# Patient Record
Sex: FEMALE
[De-identification: names, ages, dates, MRNs, and addresses within clinical notes are randomized per-mention and may not be internally consistent; named-entity substitution may affect disease eponyms.]

---

## 2022-06-07 PROBLEM — Z00.00 ENCOUNTER FOR PREVENTIVE HEALTH EXAMINATION: Status: ACTIVE | Noted: 2022-06-07

## 2022-06-09 ENCOUNTER — APPOINTMENT (OUTPATIENT)
Dept: GASTROENTEROLOGY | Facility: CLINIC | Age: 62
End: 2022-06-09
Payer: COMMERCIAL

## 2022-06-09 VITALS
HEIGHT: 65.5 IN | BODY MASS INDEX: 27 KG/M2 | OXYGEN SATURATION: 97 % | DIASTOLIC BLOOD PRESSURE: 67 MMHG | TEMPERATURE: 98.1 F | HEART RATE: 107 BPM | RESPIRATION RATE: 16 BRPM | WEIGHT: 164 LBS | SYSTOLIC BLOOD PRESSURE: 112 MMHG

## 2022-06-09 DIAGNOSIS — Z78.9 OTHER SPECIFIED HEALTH STATUS: ICD-10-CM

## 2022-06-09 DIAGNOSIS — K52.832 LYMPHOCYTIC COLITIS: ICD-10-CM

## 2022-06-09 DIAGNOSIS — K52.9 NONINFECTIVE GASTROENTERITIS AND COLITIS, UNSPECIFIED: ICD-10-CM

## 2022-06-09 DIAGNOSIS — Z87.19 PERSONAL HISTORY OF OTHER DISEASES OF THE DIGESTIVE SYSTEM: ICD-10-CM

## 2022-06-09 PROCEDURE — 99204 OFFICE O/P NEW MOD 45 MIN: CPT

## 2022-06-09 RX ORDER — BUDESONIDE 3 MG/1
3 CAPSULE, COATED PELLETS ORAL
Qty: 90 | Refills: 3 | Status: ACTIVE | COMMUNITY
Start: 2022-06-09 | End: 1900-01-01

## 2022-06-09 NOTE — ASSESSMENT
[FreeTextEntry1] : 61F with lymphocytic coliits\par - will trial low fodamp diet\par - stool, lab tests\par - budesonide if no improvement\par - f/u after above

## 2022-06-09 NOTE — HISTORY OF PRESENT ILLNESS
[de-identified] : 61F presents for microscopic colitis eval\par hx of gerd and excessive PPI use\par then that resolved but subsequently after developed diarrhea which intermittently flared recently flare is not remitting\par cscope oct 2021 shows lymphocytic colitis\par pt looking for integrative approach, dietary guidance\par no signfiicant PMHx\par no Rxs\par No toxic habits\par

## 2022-06-09 NOTE — PHYSICAL EXAM
[General Appearance - Alert] : alert [Sclera] : the sclera and conjunctiva were normal [Outer Ear] : the ears and nose were normal in appearance [Neck Appearance] : the appearance of the neck was normal [Heart Rate And Rhythm] : heart rate was normal and rhythm regular [Edema] : there was no peripheral edema [Bowel Sounds] : normal bowel sounds [Abdomen Soft] : soft [Abdomen Tenderness] : non-tender [Abdomen Mass (___ Cm)] : no abdominal mass palpated [Abnormal Walk] : normal gait [] : no rash [No Focal Deficits] : no focal deficits [Affect] : the affect was normal

## 2022-06-20 ENCOUNTER — APPOINTMENT (OUTPATIENT)
Dept: GASTROENTEROLOGY | Facility: CLINIC | Age: 62
End: 2022-06-20

## 2022-06-20 DIAGNOSIS — E55.9 VITAMIN D DEFICIENCY, UNSPECIFIED: ICD-10-CM

## 2022-06-20 LAB
25(OH)D3 SERPL-MCNC: 19.8 NG/ML
ALBUMIN SERPL ELPH-MCNC: 4.5 G/DL
ALP BLD-CCNC: 90 U/L
ALT SERPL-CCNC: 49 U/L
ANA PAT FLD IF-IMP: ABNORMAL
ANA SER IF-ACNC: ABNORMAL
ANION GAP SERPL CALC-SCNC: 14 MMOL/L
AST SERPL-CCNC: 39 U/L
BASOPHILS # BLD AUTO: 0.08 K/UL
BASOPHILS NFR BLD AUTO: 1 %
BILIRUB SERPL-MCNC: 0.7 MG/DL
BUN SERPL-MCNC: 17 MG/DL
CALCIUM SERPL-MCNC: 9.6 MG/DL
CHLORIDE SERPL-SCNC: 102 MMOL/L
CO2 SERPL-SCNC: 25 MMOL/L
CREAT SERPL-MCNC: 0.78 MG/DL
CRP SERPL-MCNC: 4 MG/L
EGFR: 86 ML/MIN/1.73M2
EOSINOPHIL # BLD AUTO: 0.26 K/UL
EOSINOPHIL NFR BLD AUTO: 3.3 %
FERRITIN SERPL-MCNC: 337 NG/ML
FOLATE SERPL-MCNC: 10.1 NG/ML
GLIADIN IGA SER QL: 5.1 UNITS
GLIADIN IGG SER QL: <5 UNITS
GLIADIN PEPTIDE IGA SER-ACNC: NEGATIVE
GLIADIN PEPTIDE IGG SER-ACNC: NEGATIVE
GLUCOSE SERPL-MCNC: 150 MG/DL
HCT VFR BLD CALC: 44.7 %
HGB BLD-MCNC: 15.4 G/DL
IGA SER QL IEP: 347 MG/DL
IMM GRANULOCYTES NFR BLD AUTO: 0.4 %
IRON SATN MFR SERPL: 33 %
IRON SERPL-MCNC: 104 UG/DL
LYMPHOCYTES # BLD AUTO: 2.4 K/UL
LYMPHOCYTES NFR BLD AUTO: 30.3 %
MAN DIFF?: NORMAL
MCHC RBC-ENTMCNC: 31.6 PG
MCHC RBC-ENTMCNC: 34.5 GM/DL
MCV RBC AUTO: 91.8 FL
MONOCYTES # BLD AUTO: 0.66 K/UL
MONOCYTES NFR BLD AUTO: 8.3 %
NEUTROPHILS # BLD AUTO: 4.49 K/UL
NEUTROPHILS NFR BLD AUTO: 56.7 %
PLATELET # BLD AUTO: 255 K/UL
POTASSIUM SERPL-SCNC: 4.1 MMOL/L
PROT SERPL-MCNC: 7.8 G/DL
RBC # BLD: 4.87 M/UL
RBC # FLD: 12.6 %
SODIUM SERPL-SCNC: 141 MMOL/L
TIBC SERPL-MCNC: 320 UG/DL
TSH SERPL-ACNC: 2.47 UIU/ML
TTG IGA SER IA-ACNC: 1.2 U/ML
TTG IGA SER-ACNC: NEGATIVE
TTG IGG SER IA-ACNC: 4 U/ML
TTG IGG SER IA-ACNC: NEGATIVE
UIBC SERPL-MCNC: 216 UG/DL
VIT B12 SERPL-MCNC: 784 PG/ML
WBC # FLD AUTO: 7.92 K/UL

## 2022-06-20 PROCEDURE — 99442: CPT

## 2022-06-20 RX ORDER — ERGOCALCIFEROL 1.25 MG/1
1.25 MG CAPSULE, LIQUID FILLED ORAL
Qty: 8 | Refills: 5 | Status: ACTIVE | COMMUNITY
Start: 2022-06-20 | End: 1900-01-01

## 2023-10-05 ENCOUNTER — TRANSCRIPTION ENCOUNTER (OUTPATIENT)
Age: 63
End: 2023-10-05

## 2023-10-06 ENCOUNTER — EMERGENCY (EMERGENCY)
Facility: HOSPITAL | Age: 63
LOS: 1 days | Discharge: HOME | End: 2023-10-06
Payer: COMMERCIAL

## 2023-10-06 VITALS
DIASTOLIC BLOOD PRESSURE: 78 MMHG | HEIGHT: 65 IN | SYSTOLIC BLOOD PRESSURE: 137 MMHG | WEIGHT: 154.98 LBS | HEART RATE: 74 BPM | OXYGEN SATURATION: 99 % | RESPIRATION RATE: 16 BRPM

## 2023-10-06 PROCEDURE — 99284 EMERGENCY DEPT VISIT MOD MDM: CPT

## 2023-10-06 RX ORDER — ACETAMINOPHEN 500 MG
650 TABLET ORAL ONCE
Refills: 0 | Status: COMPLETED | OUTPATIENT
Start: 2023-10-06 | End: 2023-10-06

## 2023-10-06 RX ORDER — TETANUS TOXOID, REDUCED DIPHTHERIA TOXOID AND ACELLULAR PERTUSSIS VACCINE, ADSORBED 5; 2.5; 8; 8; 2.5 [IU]/.5ML; [IU]/.5ML; UG/.5ML; UG/.5ML; UG/.5ML
0.5 SUSPENSION INTRAMUSCULAR ONCE
Refills: 0 | Status: COMPLETED | OUTPATIENT
Start: 2023-10-06 | End: 2023-10-06

## 2023-10-06 RX ADMIN — Medication 650 MILLIGRAM(S): at 23:42

## 2023-10-06 RX ADMIN — TETANUS TOXOID, REDUCED DIPHTHERIA TOXOID AND ACELLULAR PERTUSSIS VACCINE, ADSORBED 0.5 MILLILITER(S): 5; 2.5; 8; 8; 2.5 SUSPENSION INTRAMUSCULAR at 23:43

## 2023-10-06 NOTE — ED PROVIDER NOTE - SECONDARY DIAGNOSIS.
Facial contusion, initial encounter Fall as cause of accidental injury in home as place of occurrence

## 2023-10-06 NOTE — ED PROVIDER NOTE - CLINICAL SUMMARY MEDICAL DECISION MAKING FREE TEXT BOX
Medical decision making:  Although patient is under the age of 63, she had an LOC associated with the fall, and the location of the laceration with associated tenderness to palpation warrants imaging of the orbit.      As such we will admit CT head and max face.  No midline neck tenderness that would necessitate CT C-spine.  Patient's Tdap is not up-to-date and we will administer tonight.    Given that she has a large facial laceration, patient is requesting plastic surgery, which is reasonable at this time.

## 2023-10-06 NOTE — ED PROVIDER NOTE - NSFOLLOWUPINSTRUCTIONS_ED_ALL_ED_FT
Laceration    A laceration is a cut that goes through all of the layers of the skin and into the tissue that is right under the skin. Some lacerations heal on their own. Others need to be closed with skin adhesive strips, skin glue, stitches (sutures), or staples. Proper laceration care minimizes the risk of infection and helps the laceration to heal better.  If non-absorbable stitches or staples have been placed, they must be taken out within the time frame instructed by your healthcare provider.    SEEK IMMEDIATE MEDICAL CARE IF YOU HAVE ANY OF THE FOLLOWING SYMPTOMS: swelling around the wound, worsening pain, drainage from the wound, red streaking going away from your wound, inability to move finger or toe near the laceration, or discoloration of skin near the laceration.    Follow-up next week with plastic surgery, call to schedule an appointment:    Carlos Manuel Emerson  Plastic Surgery  [address] [place] [address]  Phone: [phone]  Fax: [phone]  Follow Up Time: 4-6 Days Laceration    A laceration is a cut that goes through all of the layers of the skin and into the tissue that is right under the skin. Some lacerations heal on their own. Others need to be closed with skin adhesive strips, skin glue, stitches (sutures), or staples. Proper laceration care minimizes the risk of infection and helps the laceration to heal better.  If non-absorbable stitches or staples have been placed, they must be taken out within the time frame instructed by your healthcare provider.    SEEK IMMEDIATE MEDICAL CARE IF YOU HAVE ANY OF THE FOLLOWING SYMPTOMS: swelling around the wound, worsening pain, drainage from the wound, red streaking going away from your wound, inability to move finger or toe near the laceration, or discoloration of skin near the laceration.    Follow-up next week with plastic surgery, call to schedule an appointment:    Carlos Manuel Emerson  Plastic Surgery  [address] [place] [address]  Phone: [phone]  Fax: [phone]  Follow Up Time: 4-6 Days Laceration    A laceration is a cut that goes through all of the layers of the skin and into the tissue that is right under the skin. Some lacerations heal on their own. Others need to be closed with skin adhesive strips, skin glue, stitches (sutures), or staples. Proper laceration care minimizes the risk of infection and helps the laceration to heal better.  If non-absorbable stitches or staples have been placed, they must be taken out within the time frame instructed by your healthcare provider.    SEEK IMMEDIATE MEDICAL CARE IF YOU HAVE ANY OF THE FOLLOWING SYMPTOMS: swelling around the wound, worsening pain, drainage from the wound, red streaking going away from your wound, inability to move finger or toe near the laceration, or discoloration of skin near the laceration.    Follow-up next week with plastic surgery, call to schedule an appointment:    Carlos Manuel Emerson  Plastic Surgery  [address] [place] [address]  Phone: [phone]  Fax: [phone]  Follow Up Time: 4-6 Days

## 2023-10-06 NOTE — ED PROVIDER NOTE - CARE PLAN
1 Principal Discharge DX:	Laceration of face, initial encounter  Secondary Diagnosis:	Facial contusion, initial encounter  Secondary Diagnosis:	Fall as cause of accidental injury in home as place of occurrence

## 2023-10-06 NOTE — ED ADULT TRIAGE NOTE - PATIENT ON (OXYGEN DELIVERY METHOD)
Render In Strict Bullet Format?: No Continue Regimen: valacyclovir 1 gram tablet bid\\nQuantity: 30.0 Tablet\\nSi tabs po two times a day in a single day per episode Detail Level: Detailed room air

## 2023-10-06 NOTE — ED PROVIDER NOTE - PHYSICAL EXAMINATION
Vital signs: Within normal limits  General: Adult female no acute distress, bandaged face L orbital area  Neck: supple, lungs no midline tenderness to palpation  Resp: No distress.  Ext: no deformities.    Skin: 3 cm horizontally oriented laceration under the left eyebrow.  Neuro: alert and oriented to person, place, time.

## 2023-10-06 NOTE — ED PROVIDER NOTE - ATTENDING CONTRIBUTION TO CARE
MD Horn:  patient seen and evaluated with the resident.  I was present for key portions of the History & Physical, and I agree with the Impression & Plan.    Patient is a 63-year-old female brought in by  for evaluation of mechanical fall with associated head injury and left supraorbital laceration.      Context: Patient stumbled and fell forward striking her face on the floor pushing the rim of her glasses up into her supraorbital area causing a laceration.   reports brief LOC.      Associated symptoms: No blurry vision or double vision    Vital signs: Within normal limits  General: Adult female no acute distress, bandaged face L orbital area  Neck: supple, lungs no midline tenderness to palpation  Resp: No distress.  Ext: no deformities.    Skin: 3 cm horizontally oriented laceration under the left eyebrow.  Neuro: alert and oriented to person, place, time.    Medical decision making:  Although patient is under the age of 63, she had an LOC associated with the fall, and the location of the laceration with associated tenderness to palpation warrants imaging of the orbit.      As such we will admit CT head and max face.  No midline neck tenderness that would necessitate CT C-spine.  Patient's Tdap is not up-to-date and we will administer tonight.    Given that she has a large facial laceration, patient is requesting plastic surgery, which is reasonable at this time.

## 2023-10-06 NOTE — ED PROVIDER NOTE - CARE PROVIDER_API CALL
Carlos Manuel Emerson  Plastic Surgery  [address] [place] [address]  Phone: [phone]  Fax: [phone]  Follow Up Time: 4-6 Days   Carlos Manuel Emerson  Plastic Surgery  [address] [place] [address]  Phone: [phone]  Fax: [phone]  Follow Up Time: 4-6 Days   Carlos Manuel Emerson  Plastic Surgery  [address] [place] [address]  Phone: [phone]  Fax: [phone]  Follow Up Time: 4-6 Days

## 2023-10-06 NOTE — ED PROVIDER NOTE - PATIENT PORTAL LINK FT
You can access the FollowMyHealth Patient Portal offered by [facility] by registering at the following website: [email or web address]. By joining [employer]’s FollowMyHealth portal, you will also be able to view your health information using other applications (apps) compatible with our system. You can access the FollowMyHealth Patient Portal offered by [facility] by registering at the following website: [email or web address]. By joining [employer]’s FollowMyHealth portal, you will also be able to view your health information using other applications (apps) compatible with our system. You can access the FollowMyHealth Patient Portal offered by [facility] by registering at the following website: [email or web address]. By joining [employer]’s FollowMyHealth portal, you will also be able to view your health information using other applications (apps) compatible with our system.

## 2023-10-06 NOTE — ED PROVIDER NOTE - CHILD ABUSE FACILITY
[facility]

## 2023-10-06 NOTE — ED ADULT TRIAGE NOTE - BMI (KG/M2)
Relevant Problems   No relevant active problems       Anesthetic History   No history of anesthetic complications            Review of Systems / Medical History  Patient summary reviewed, nursing notes reviewed and pertinent labs reviewed    Pulmonary  Within defined limits                 Neuro/Psych         Psychiatric history     Cardiovascular                  Exercise tolerance: >4 METS     GI/Hepatic/Renal                Endo/Other      Hypothyroidism  Morbid obesity and arthritis     Other Findings              Physical Exam    Airway  Mallampati: I           Cardiovascular               Dental         Pulmonary                 Abdominal         Other Findings            Anesthetic Plan    ASA: 3  Anesthesia type: MAC          Induction: Intravenous  Anesthetic plan and risks discussed with: Patient 25.8

## 2023-10-07 PROCEDURE — 99285 EMERGENCY DEPT VISIT HI MDM: CPT | Mod: 25

## 2023-10-07 PROCEDURE — 70450 CT HEAD/BRAIN W/O DYE: CPT | Mod: 26,MA

## 2023-10-07 PROCEDURE — 90471 IMMUNIZATION ADMIN: CPT

## 2023-10-07 PROCEDURE — 13151 CMPLX RPR E/N/E/L 1.1-2.5 CM: CPT

## 2023-10-07 PROCEDURE — 70486 CT MAXILLOFACIAL W/O DYE: CPT | Mod: 26,MA

## 2023-10-07 PROCEDURE — 76377 3D RENDER W/INTRP POSTPROCES: CPT | Mod: 26

## 2023-10-07 PROCEDURE — 76377 3D RENDER W/INTRP POSTPROCES: CPT

## 2023-10-07 PROCEDURE — 70450 CT HEAD/BRAIN W/O DYE: CPT | Mod: MA

## 2023-10-07 PROCEDURE — 70486 CT MAXILLOFACIAL W/O DYE: CPT | Mod: MA

## 2023-10-07 PROCEDURE — 90715 TDAP VACCINE 7 YRS/> IM: CPT

## 2023-10-07 RX ORDER — IBUPROFEN 200 MG
600 TABLET ORAL ONCE
Refills: 0 | Status: COMPLETED | OUTPATIENT
Start: 2023-10-07 | End: 2023-10-07

## 2023-10-07 RX ADMIN — Medication 600 MILLIGRAM(S): at 02:35

## 2023-10-07 NOTE — ED ADULT NURSE NOTE - NSFALLRISKINTERV_ED_ALL_ED
Communicate fall risk and risk factors to all staff, patient, and family/Provide visual cue: yellow wristband, yellow gown, etc/Reinforce activity limits and safety measures with patient and family/Call bell, personal items and telephone in reach/Instruct patient to call for assistance before getting out of bed/chair/stretcher/Non-slip footwear applied when patient is off stretcher/[place] to call system/Physically safe environment - no spills, clutter or unnecessary equipment/Purposeful Proactive Rounding/Room/bathroom lighting operational, light cord in reach Communicate fall risk and risk factors to all staff, patient, and family/Provide visual cue: yellow wristband, yellow gown, etc/Reinforce activity limits and safety measures with patient and family/Call bell, personal items and telephone in reach/Instruct patient to call for assistance before getting out of bed/chair/stretcher/Non-slip footwear applied when patient is off stretcher/[place] to call system/Physically safe environment - no spills, clutter or unnecessary equipment/Purposeful Proactive Rounding/Room/bathroom lighting operational, light cord in reach Communicate fall risk and risk factors to all staff, patient, and family/Provide visual cue: yellow wristband, yellow gown, etc/Reinforce activity limits and safety measures with patient and family/Call bell, personal items and telephone in reach/Instruct patient to call for assistance before getting out of bed/chair/stretcher/Non-slip footwear applied when patient is off stretcher/[place] to call system/Physically safe environment - no spills, clutter or unnecessary equipment/Purposeful Proactive Rounding/Room/bathroom lighting operational, light cord in reach

## 2023-10-07 NOTE — PROGRESS NOTE ADULT - SUBJECTIVE AND OBJECTIVE BOX
Carlos Manuel Emerson MD FACS  Plastic & Reconstructive Surgery  [address] [place], [place] [id number] [phone]    Patient Info:ANDRES [id number]  [facility] ED  The patient is a  63y  Females/p fall   with open wound left forehead through eyebrow    Asked to evaluate by ER    T(C): --  HR: 74 (10-06-23 @ 22:47) (74 - 74)  BP: 137/78 (10-06-23 @ 22:47) (137/78 - 137/78)  RR: 16 (10-06-23 @ 22:47) (16 - 16)  SpO2: 99% (10-06-23 @ 22:47) (99% - 99%)    RBA of repair reviewed  Tolerated repair  OK to wash would with soap and water  Aquaphor to suture line    Call office for follow up   Carlos Manuel Emerson MD FACS  Plastic & Reconstructive Surgery  [address] [place], [place] [id number] [phone]    Patient Info:ANDRES [id number]  [facility] ED  The patient is a  63y  Females/p fall   with open wound left forehead through eyebrow    Asked to evaluate by ER    T(C): --  HR: 74 (10-06-23 @ 22:47) (74 - 74)  BP: 137/78 (10-06-23 @ 22:47) (137/78 - 137/78)  RR: 16 (10-06-23 @ 22:47) (16 - 16)  SpO2: 99% (10-06-23 @ 22:47) (99% - 99%)    RBA of repair reviewed  Tolerated repair  OK to wash would with soap and water  Aquaphor to suture line    Call office for follow up   Carlos Manuel Emerson MD FACS  Plastic & Reconstructive Surgery  [address] [place], [place] [address] [phone]    Patient Info:ANDRES [id number]  [facility] ED  The patient is a  63y  Females/p fall   with open wound left forehead through eyebrow    Asked to evaluate by ER    T(C): --  HR: 74 (10-06-23 @ 22:47) (74 - 74)  BP: 137/78 (10-06-23 @ 22:47) (137/78 - 137/78)  RR: 16 (10-06-23 @ 22:47) (16 - 16)  SpO2: 99% (10-06-23 @ 22:47) (99% - 99%)    RBA of repair reviewed  Tolerated repair  OK to wash would with soap and water  Aquaphor to suture line    Call office for follow up

## 2023-10-07 NOTE — ED ADULT NURSE NOTE - OBJECTIVE STATEMENT
64yo F, full code, presenting after LOC associated with the fall, and the location of the laceration with associated tenderness to palpation warrants imaging of the orbit.  Lac noted above left eyebrow. Given that she has a large facial laceration, patient is requesting plastic surgery, which is reasonable at this time. 62yo F, full code, presenting after LOC associated with the fall, and the location of the laceration with associated tenderness to palpation warrants imaging of the orbit.  Lac noted above left eyebrow. Given that she has a large facial laceration, patient is requesting plastic surgery, which is reasonable at this time.

## 2023-11-29 NOTE — ED ADULT TRIAGE NOTE - HEIGHT IN FEET
Consent: The patient's verbal consent was obtained including but not limited to risks of crusting, scabbing, blistering, scarring, darker or lighter pigmentary change, recurrence, incomplete removal and infection. Show Aperture Variable?: Yes Detail Level: Detailed Duration Of Freeze Thaw-Cycle (Seconds): 0 Render Note In Bullet Format When Appropriate: No Post-Care Instructions: You have just had cryotherapy for the treatment of a pre-cancerous or other benign (non-cancerous) skin lesions. You can expect the pain to rapidly decrease over the next 45 minutes. The area treated will initially turn red and over the next 72 hours turn brown to black. A scab will form that will peel off by itself within 5 to 7 days. A blister or reddish-purple blood blister may form where the area has been treated. When the scab forms, you can apply Vaseline or Scar Recovery Gel twice a day to the wound. This product will improve the healing of the wound, decreasing the appearance of red or pink pigment changes in the wound. The gel has also been shown to significantly decrease itching while the wound heals. You can purchase the Scar Recovery Gel at our office.\\n\\nCan I wash or use makeup? Yes\\n\\nShould I break the blister should one form?\\nIf the blister is bothersome, you may break the blister with a clean needle if the lesion is on the body. However, we do not recommend doing this for lesions on the face. Keep the area dry and as clean as possible in order to prevent infection. Natural skin is the best protection to prevent infection.\\n\\nWill this leave a scar?\\nIt could, but it is very unlikely. However, it may leave a slight discoloration, which should be temporary.\\n\\nWhat if the skin growth doesn't go away after this has healed?\\nThe providers treated this area believing it did not have cancerous roots and was strictly limited to the surface of the skin as a pre-cancerous or benign growth would be. The growth may not disappear or it may return over a period of time. You need to have this area checked again at your follow-up appointment to ensure that it does not need further treatment or that it has resolved. Medical Necessity Information: It is in your best interest to select a reason for this procedure from the list below. All of these items fulfill various CMS LCD requirements except the new and changing color options. Post-Care Instructions: You have just had cryotherapy for the treatment of benign (non-cancerous) skin lesions. You can expect the pain to rapidly decrease over the next 45 minutes. The area treated will initially turn red and over the next 72 hours turn brown to black. A scab will form that will peel off by itself within 5 to 7 days. A blister or reddish-purple blood blister may form where the area has been treated. When the scab forms, you can apply Vaseline or Scar Recovery Gel twice a day to the wound. This product will improve the healing of the wound, decreasing the appearance of red or pink pigment changes in the wound. The gel has also been shown to significantly decrease itching while the wound heals. You can purchase the Scar Recovery Gel at our office.\\n\\nCan I wash or use makeup? Yes\\n\\nShould I break the blister should one form?\\nIf the blister is bothersome, you may break the blister with a clean needle if the lesion is on the body. However, we do not recommend doing this for lesions on the face. Keep the area dry and as clean as possible in order to prevent infection. Natural skin is the best protection to prevent infection.\\n\\nWill this leave a scar?\\nIt could, but it is very unlikely. However, it may leave a slight discoloration, which should be temporary.\\n\\nWhat if the skin growth doesn't go away after this has healed?\\nThe providers treated this area believing it did not have cancerous roots and was strictly limited to the surface of the skin as a pre-cancerous or benign growth would be. The growth may not disappear or it may return over a period of time. You need to have this area checked again at your follow-up appointment to ensure that it does not need further treatment or that it has resolved. Spray Paint Text: The liquid nitrogen was applied to the skin utilizing a spray paint frosting technique. Medical Necessity Clause: This procedure was medically necessary because the lesions that were treated were: 5